# Patient Record
Sex: MALE | Race: WHITE | Employment: OTHER
[De-identification: names, ages, dates, MRNs, and addresses within clinical notes are randomized per-mention and may not be internally consistent; named-entity substitution may affect disease eponyms.]

---

## 2018-01-27 ENCOUNTER — HEALTH MAINTENANCE LETTER (OUTPATIENT)
Age: 78
End: 2018-01-27

## 2022-06-20 ENCOUNTER — TRANSFERRED RECORDS (OUTPATIENT)
Dept: HEALTH INFORMATION MANAGEMENT | Facility: CLINIC | Age: 82
End: 2022-06-20

## 2022-06-28 ENCOUNTER — TRANSFERRED RECORDS (OUTPATIENT)
Dept: HEALTH INFORMATION MANAGEMENT | Facility: CLINIC | Age: 82
End: 2022-06-28

## 2022-06-29 ENCOUNTER — TRANSFERRED RECORDS (OUTPATIENT)
Dept: HEALTH INFORMATION MANAGEMENT | Facility: CLINIC | Age: 82
End: 2022-06-29

## 2022-07-07 ENCOUNTER — MEDICAL CORRESPONDENCE (OUTPATIENT)
Dept: HEALTH INFORMATION MANAGEMENT | Facility: CLINIC | Age: 82
End: 2022-07-07

## 2022-07-08 ENCOUNTER — NURSING HOME VISIT (OUTPATIENT)
Dept: GERIATRICS | Facility: CLINIC | Age: 82
End: 2022-07-08
Payer: MEDICARE

## 2022-07-08 DIAGNOSIS — I48.92 ATRIAL FLUTTER, UNSPECIFIED TYPE (H): ICD-10-CM

## 2022-07-08 DIAGNOSIS — L97.909 STASIS ULCER OF LOWER EXTREMITY, UNSPECIFIED LATERALITY (H): ICD-10-CM

## 2022-07-08 DIAGNOSIS — J96.21 ACUTE ON CHRONIC RESPIRATORY FAILURE WITH HYPOXIA (H): ICD-10-CM

## 2022-07-08 DIAGNOSIS — C78.7 MALIGNANT NEOPLASM METASTATIC TO LIVER (H): Primary | ICD-10-CM

## 2022-07-08 DIAGNOSIS — J44.9 MODERATE COPD (CHRONIC OBSTRUCTIVE PULMONARY DISEASE) (H): ICD-10-CM

## 2022-07-08 DIAGNOSIS — E11.51 PERIPHERAL CIRCULATORY DISORDER DUE TO TYPE 2 DIABETES MELLITUS (H): ICD-10-CM

## 2022-07-08 DIAGNOSIS — I11.0 HYPERTENSIVE HEART DISEASE WITH HEART FAILURE (H): ICD-10-CM

## 2022-07-08 DIAGNOSIS — I83.009 STASIS ULCER OF LOWER EXTREMITY, UNSPECIFIED LATERALITY (H): ICD-10-CM

## 2022-07-08 PROBLEM — Z86.16 HISTORY OF SEVERE ACUTE RESPIRATORY SYNDROME CORONAVIRUS 2 (SARS-COV-2) DISEASE: Status: ACTIVE | Noted: 2020-12-24

## 2022-07-08 PROBLEM — J96.90 RESPIRATORY FAILURE (H): Status: ACTIVE | Noted: 2021-10-15

## 2022-07-08 PROBLEM — C34.30: Status: ACTIVE | Noted: 2021-12-16

## 2022-07-08 PROBLEM — R06.00 DYSPNEA: Status: ACTIVE | Noted: 2019-12-03

## 2022-07-08 PROBLEM — R29.6 REPEATED FALLS: Status: ACTIVE | Noted: 2018-08-11

## 2022-07-08 PROBLEM — F17.200 TOBACCO DEPENDENCE SYNDROME: Status: ACTIVE | Noted: 2018-08-12

## 2022-07-08 PROBLEM — D64.9 ANEMIA: Status: ACTIVE | Noted: 2022-05-25

## 2022-07-08 PROBLEM — R09.02 HYPOXIA: Status: ACTIVE | Noted: 2022-06-15

## 2022-07-08 PROBLEM — R60.0 EDEMA OF EXTREMITIES: Status: ACTIVE | Noted: 2021-03-01

## 2022-07-08 PROCEDURE — 99308 SBSQ NF CARE LOW MDM 20: CPT | Mod: GV | Performed by: FAMILY MEDICINE

## 2022-07-08 NOTE — PROGRESS NOTES
[facility] GERIATRICS    PRIMARY CARE PROVIDER AND CLINIC:  Mickey Vera [facility], [facility] [place] [address] / [place] [address]     [facility] Record Number:  [id number]  Place of Service where encounter took place: [facility]    Jakub Edward  is a 82 year old  (1940), admitted to the above facility from  [facility]. Hospital stay 6/20/2022 through 6/23/2022..   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    Hospitalized     Diverticulosis of colon (without mention of hemorrhage)     history of diverticulosis     Diverticulosis of colon (without mention of hemorrhage) 11/23/04    numerous diverticula      Essential hypertension, benign      Impotence of organic origin     erectile dysfunction     Obesity, unspecified      Other and unspecified hyperlipidemia      Personal history of tobacco use, presenting hazards to health      Pure hyperglyceridemia      Type II or unspecified type diabetes mellitus without mention of complication, not stated as uncontrolled      Unspecified hemorrhoids without mention of complication     history of hemorrhoids      PAST SURGICAL HISTORY:   has a past surgical history that includes CARDIOVASC NUCL EXAM UNLISTED (06/29/00); COLONOSCOPY THRU STOMA, DIAGNOSTIC (12/20/00); COLONOSCOPY W SNARE REMOVAL TUMOR/POLYP/LESION (11/23/04); COLONOSCOPY W BIOPSY (11/12/07); CABG, ARTERIAL, FOUR+ (06/05/2013); and THYROIDECTOMY,SUBSTERNAL,TRANSTHOR ZENOBIA (12/12/13).  FAMILY HISTORY: family history includes Cancer in his father and paternal uncle; Cardiovascular in his mother; Eye Disorder in his maternal aunt; Hypertension in his mother; Lipids in his mother.  SOCIAL  HISTORY:   reports that he quit smoking about 9 years ago. His smoking use included cigarettes. He has a 41.00 pack-year smoking history. He has never used smokeless tobacco. He reports current alcohol use. He reports that he does not use drugs.  Patient's living condition: lives in a skilled nursing facility    Post Discharge Medication Reconciliation Status: discharge medications reconciled, continue medications without change  Current Outpatient Medications   Medication Sig     albuterol (ALBUTEROL) 108 (90 BASE) MCG/ACT inhaler Inhale 2 puffs into the lungs every 4 hours as needed For shortness of breath/wheezing     amLODIPine (NORVASC) 5 MG tablet Take 2 tablets (10 mg) by mouth daily     aspirin 81 MG tablet Take 1 tablet by mouth daily.     buPROPion (WELLBUTRIN XL) 150 MG 24 hr tablet Take 2 tablets (300 mg) by mouth every morning     cholecalciferol 1000 UNITS TABS Take 1 tablet by mouth daily With food     docusate sodium (COLACE) 100 MG capsule Take 1 capsule by mouth daily     fish oil-omega-3 fatty acids (FISH OIL) 1000 MG capsule Take 1 capsule by mouth every other day.     Garlic TABS Take 1 tablet by mouth every other day.     glipiZIDE (GLUCOTROL) 10 MG tablet Take 1 tablet by mouth 2 times daily (before meals).     ibuprofen (ADVIL,MOTRIN) 400 MG tablet Take 400 mg by mouth every 4 hours as needed For pain     indomethacin (INDOCIN) 50 MG capsule Take 1 capsule by mouth 2 times daily as needed.     lisinopril (PRINIVIL,ZESTRIL) 10 MG tablet Take two tablets, 20 mg, in the morning and one tablet, 10 mg, in the evening.     metoprolol (LOPRESSOR) 25 MG tablet Take 1 tablet (25 mg) by mouth 2 times daily     Multiple Vitamins-Minerals (CENTRUM PO) Take  by mouth.     omeprazole (PRILOSEC) 20 MG capsule Take 20 mg by mouth daily as needed For dyspepsia     ORDER FOR DME Glucometer.  Test 2 times daily. High frequency testing reason: Hypertension.  RADHA: 99  Insulin dependent: No  Dx Code: 250.00      ORDER FOR DME Glucose monitoring test strips and lancets.       Test 2 times daily. High frequency testing reason: uncontrolled diabetes, hypertension, Pure Hypercholesterolem      RADHA: 99       Insulin dependent: NO     Insulin Pump: n/a     Dx Code: 250.00     rosuvastatin (CRESTOR) 40 MG tablet Take 1 tablet (40 mg) by mouth daily     No current facility-administered medications for this visit.       ROS:  Limited secondary to cognitive impairment but today pt reports HE WOULD LIKE HC POA activated as he is tired.    Vitals:  There were no vitals taken for this visit.  Exam:  GENERAL APPEARANCE:  morbidly obese, somnolent, appears ill  RESP:  diminished breath sounds Throughout  NEURO:   MS Oriented x 1, but knows daughter at bedside.    Lab/Diagnostic data:      ASSESSMENT/PLAN:    (C78.7) Malignant neoplasm metastatic to liver (H)  (primary encounter diagnosis)  Comment: Patient enrolled in hospice.    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  He used morphine last night for comfort which I agree with.      Total time spent with patient visit at the skilled nursing facility was 15 min including patient visit and review of past records. Greater than 50% of total time spent with counseling and coordinating care due to problem list.     Electronically signed by:  Stacy Delaney MD